# Patient Record
Sex: MALE | URBAN - METROPOLITAN AREA
[De-identification: names, ages, dates, MRNs, and addresses within clinical notes are randomized per-mention and may not be internally consistent; named-entity substitution may affect disease eponyms.]

---

## 2019-08-30 ENCOUNTER — HOSPITAL ENCOUNTER (OUTPATIENT)
Facility: CLINIC | Age: 49
Setting detail: OBSERVATION
Discharge: LEFT BEFORE DISCHARGE | End: 2019-08-31
Attending: EMERGENCY MEDICINE | Admitting: HOSPITALIST
Payer: COMMERCIAL

## 2019-08-30 DIAGNOSIS — F15.929 METHAMPHETAMINE INTOXICATION (H): ICD-10-CM

## 2019-08-30 LAB
BASOPHILS # BLD AUTO: 0 10E9/L (ref 0–0.2)
BASOPHILS NFR BLD AUTO: 0.5 %
DIFFERENTIAL METHOD BLD: NORMAL
EOSINOPHIL # BLD AUTO: 0.1 10E9/L (ref 0–0.7)
EOSINOPHIL NFR BLD AUTO: 1.4 %
ERYTHROCYTE [DISTWIDTH] IN BLOOD BY AUTOMATED COUNT: 13.1 % (ref 10–15)
HCT VFR BLD AUTO: 41.8 % (ref 40–53)
HGB BLD-MCNC: 14.8 G/DL (ref 13.3–17.7)
IMM GRANULOCYTES # BLD: 0 10E9/L (ref 0–0.4)
IMM GRANULOCYTES NFR BLD: 0.7 %
LYMPHOCYTES # BLD AUTO: 1.6 10E9/L (ref 0.8–5.3)
LYMPHOCYTES NFR BLD AUTO: 26.6 %
MCH RBC QN AUTO: 30.1 PG (ref 26.5–33)
MCHC RBC AUTO-ENTMCNC: 35.4 G/DL (ref 31.5–36.5)
MCV RBC AUTO: 85 FL (ref 78–100)
MONOCYTES # BLD AUTO: 0.4 10E9/L (ref 0–1.3)
MONOCYTES NFR BLD AUTO: 6.6 %
NEUTROPHILS # BLD AUTO: 3.8 10E9/L (ref 1.6–8.3)
NEUTROPHILS NFR BLD AUTO: 64.2 %
NRBC # BLD AUTO: 0 10*3/UL
NRBC BLD AUTO-RTO: 0 /100
PLATELET # BLD AUTO: 345 10E9/L (ref 150–450)
RBC # BLD AUTO: 4.91 10E12/L (ref 4.4–5.9)
WBC # BLD AUTO: 5.9 10E9/L (ref 4–11)

## 2019-08-30 PROCEDURE — 85025 COMPLETE CBC W/AUTO DIFF WBC: CPT | Performed by: EMERGENCY MEDICINE

## 2019-08-30 PROCEDURE — 99285 EMERGENCY DEPT VISIT HI MDM: CPT

## 2019-08-30 PROCEDURE — 80053 COMPREHEN METABOLIC PANEL: CPT | Performed by: EMERGENCY MEDICINE

## 2019-08-30 PROCEDURE — 93005 ELECTROCARDIOGRAM TRACING: CPT

## 2019-08-30 PROCEDURE — 80329 ANALGESICS NON-OPIOID 1 OR 2: CPT | Performed by: EMERGENCY MEDICINE

## 2019-08-30 RX ORDER — LORAZEPAM 2 MG/ML
1 INJECTION INTRAMUSCULAR ONCE
Status: DISCONTINUED | OUTPATIENT
Start: 2019-08-30 | End: 2019-08-31

## 2019-08-30 ASSESSMENT — ENCOUNTER SYMPTOMS
NAUSEA: 1
VOMITING: 0

## 2019-08-31 VITALS
SYSTOLIC BLOOD PRESSURE: 135 MMHG | OXYGEN SATURATION: 97 % | RESPIRATION RATE: 16 BRPM | TEMPERATURE: 97.6 F | HEART RATE: 92 BPM | DIASTOLIC BLOOD PRESSURE: 90 MMHG | WEIGHT: 256.17 LBS

## 2019-08-31 PROBLEM — T43.621A OVERDOSE BY AMPHETAMINE (H): Status: ACTIVE | Noted: 2019-08-31

## 2019-08-31 LAB
ALBUMIN SERPL-MCNC: 3.7 G/DL (ref 3.4–5)
ALP SERPL-CCNC: 75 U/L (ref 40–150)
ALT SERPL W P-5'-P-CCNC: 102 U/L (ref 0–70)
AMPHETAMINES UR QL SCN: POSITIVE
ANION GAP SERPL CALCULATED.3IONS-SCNC: 8 MMOL/L (ref 3–14)
APAP SERPL-MCNC: <2 MG/L (ref 10–20)
AST SERPL W P-5'-P-CCNC: 63 U/L (ref 0–45)
BARBITURATES UR QL: NEGATIVE
BENZODIAZ UR QL: NEGATIVE
BILIRUB SERPL-MCNC: 0.8 MG/DL (ref 0.2–1.3)
BUN SERPL-MCNC: 10 MG/DL (ref 7–30)
CALCIUM SERPL-MCNC: 9.1 MG/DL (ref 8.5–10.1)
CANNABINOIDS UR QL SCN: NEGATIVE
CHLORIDE SERPL-SCNC: 104 MMOL/L (ref 94–109)
CO2 SERPL-SCNC: 26 MMOL/L (ref 20–32)
COCAINE UR QL: NEGATIVE
CREAT SERPL-MCNC: 0.76 MG/DL (ref 0.66–1.25)
GFR SERPL CREATININE-BSD FRML MDRD: >90 ML/MIN/{1.73_M2}
GLUCOSE SERPL-MCNC: 108 MG/DL (ref 70–99)
INTERPRETATION ECG - MUSE: NORMAL
OPIATES UR QL SCN: NEGATIVE
PCP UR QL SCN: NEGATIVE
POTASSIUM SERPL-SCNC: 3.6 MMOL/L (ref 3.4–5.3)
PROT SERPL-MCNC: 8.3 G/DL (ref 6.8–8.8)
SODIUM SERPL-SCNC: 138 MMOL/L (ref 133–144)

## 2019-08-31 PROCEDURE — 25000128 H RX IP 250 OP 636: Performed by: EMERGENCY MEDICINE

## 2019-08-31 PROCEDURE — 99219 ZZC INITIAL OBSERVATION CARE,LEVL II: CPT | Performed by: HOSPITALIST

## 2019-08-31 PROCEDURE — G0378 HOSPITAL OBSERVATION PER HR: HCPCS

## 2019-08-31 PROCEDURE — 99203 OFFICE O/P NEW LOW 30 MIN: CPT | Performed by: PSYCHIATRY & NEUROLOGY

## 2019-08-31 PROCEDURE — 93005 ELECTROCARDIOGRAM TRACING: CPT

## 2019-08-31 PROCEDURE — 96360 HYDRATION IV INFUSION INIT: CPT

## 2019-08-31 PROCEDURE — 93010 ELECTROCARDIOGRAM REPORT: CPT | Performed by: INTERNAL MEDICINE

## 2019-08-31 PROCEDURE — 40000275 ZZH STATISTIC RCP TIME EA 10 MIN

## 2019-08-31 PROCEDURE — 80307 DRUG TEST PRSMV CHEM ANLYZR: CPT | Performed by: HOSPITALIST

## 2019-08-31 PROCEDURE — 25800030 ZZH RX IP 258 OP 636: Performed by: HOSPITALIST

## 2019-08-31 RX ORDER — SODIUM CHLORIDE 9 MG/ML
INJECTION, SOLUTION INTRAVENOUS CONTINUOUS
Status: DISCONTINUED | OUTPATIENT
Start: 2019-08-31 | End: 2019-08-31 | Stop reason: HOSPADM

## 2019-08-31 RX ORDER — AMOXICILLIN 250 MG
2 CAPSULE ORAL 2 TIMES DAILY PRN
Status: DISCONTINUED | OUTPATIENT
Start: 2019-08-31 | End: 2019-08-31 | Stop reason: HOSPADM

## 2019-08-31 RX ORDER — ACETAMINOPHEN 325 MG/1
650 TABLET ORAL EVERY 4 HOURS PRN
Status: DISCONTINUED | OUTPATIENT
Start: 2019-08-31 | End: 2019-08-31 | Stop reason: HOSPADM

## 2019-08-31 RX ORDER — ACETAMINOPHEN 650 MG/1
650 SUPPOSITORY RECTAL EVERY 4 HOURS PRN
Status: DISCONTINUED | OUTPATIENT
Start: 2019-08-31 | End: 2019-08-31 | Stop reason: HOSPADM

## 2019-08-31 RX ORDER — LANOLIN ALCOHOL/MO/W.PET/CERES
3 CREAM (GRAM) TOPICAL
Status: DISCONTINUED | OUTPATIENT
Start: 2019-08-31 | End: 2019-08-31 | Stop reason: HOSPADM

## 2019-08-31 RX ORDER — POLYETHYLENE GLYCOL 3350 17 G/17G
17 POWDER, FOR SOLUTION ORAL DAILY PRN
Status: DISCONTINUED | OUTPATIENT
Start: 2019-08-31 | End: 2019-08-31 | Stop reason: HOSPADM

## 2019-08-31 RX ORDER — BISACODYL 10 MG
10 SUPPOSITORY, RECTAL RECTAL DAILY PRN
Status: DISCONTINUED | OUTPATIENT
Start: 2019-08-31 | End: 2019-08-31 | Stop reason: HOSPADM

## 2019-08-31 RX ORDER — NALOXONE HYDROCHLORIDE 0.4 MG/ML
.1-.4 INJECTION, SOLUTION INTRAMUSCULAR; INTRAVENOUS; SUBCUTANEOUS
Status: DISCONTINUED | OUTPATIENT
Start: 2019-08-31 | End: 2019-08-31 | Stop reason: HOSPADM

## 2019-08-31 RX ORDER — ONDANSETRON 2 MG/ML
4 INJECTION INTRAMUSCULAR; INTRAVENOUS EVERY 6 HOURS PRN
Status: DISCONTINUED | OUTPATIENT
Start: 2019-08-31 | End: 2019-08-31 | Stop reason: HOSPADM

## 2019-08-31 RX ORDER — AMOXICILLIN 250 MG
1 CAPSULE ORAL 2 TIMES DAILY PRN
Status: DISCONTINUED | OUTPATIENT
Start: 2019-08-31 | End: 2019-08-31 | Stop reason: HOSPADM

## 2019-08-31 RX ORDER — ONDANSETRON 4 MG/1
4 TABLET, ORALLY DISINTEGRATING ORAL EVERY 6 HOURS PRN
Status: DISCONTINUED | OUTPATIENT
Start: 2019-08-31 | End: 2019-08-31 | Stop reason: HOSPADM

## 2019-08-31 RX ORDER — LIDOCAINE 40 MG/G
CREAM TOPICAL
Status: DISCONTINUED | OUTPATIENT
Start: 2019-08-31 | End: 2019-08-31 | Stop reason: HOSPADM

## 2019-08-31 RX ADMIN — SODIUM CHLORIDE: 9 INJECTION, SOLUTION INTRAVENOUS at 02:39

## 2019-08-31 RX ADMIN — SODIUM CHLORIDE 1000 ML: 9 INJECTION, SOLUTION INTRAVENOUS at 00:00

## 2019-08-31 RX ADMIN — SODIUM CHLORIDE: 9 INJECTION, SOLUTION INTRAVENOUS at 12:45

## 2019-08-31 NOTE — PROGRESS NOTES
RECEIVING UNIT ED HANDOFF REVIEW    ED Nurse Handoff Report was reviewed by: Elba Farfan RN on August 31, 2019 at 1:17 AM

## 2019-08-31 NOTE — ED NOTES
"[facility]  ED Nurse Handoff Report    ED Chief complaint: Ingestion      ED Diagnosis:   Final diagnoses:   Methamphetamine intoxication (H)       Code Status: Full Code    Allergies: No Known Allergies    Activity level - Baseline/Home:  Independent  Activity Level - Current:   Stand with Assist    Patient's Preferred language: English   Needed?: No    Isolation: No  Infection: Not Applicable  Bariatric?: No    Vital Signs:   Vitals:    08/30/19 2208 08/30/19 2245 08/31/19 0000 08/31/19 0014   BP: (!) 172/99  (!) 130/93    Pulse:   99    Temp:  98.2  F (36.8  C)     TempSrc:  Oral     SpO2: 98%  95% 93%       Cardiac Rhythm: ,        Pain level:      Is this patient confused?: No   Does this patient have a guardian?  No         If yes, is there guardianship documents in the Epic \"Code/ACP\" activity?  N/A         Guardian Notified?  N/A  [place] - Suicide Severity Rating Scale Completed?  Yes  If yes, what color did the patient score?  White    Patient Report: Initial Complaint: C/O meth ingestion.  Focused Assessment: A & O.  Hypertensive but other VSS.  Pt stated that he ingested 2-3 grams of meth; prior to his process of getting arrested.  Pt has been on [place].  Pt calm, cooperative and currently sleeping.    Tests Performed: labs, UA (pt has not given sample yet)  Abnormal Results:   Results for orders placed or performed during the hospital encounter of 08/30/19   CBC with platelets differential   Result Value Ref Range    WBC 5.9 4.0 - 11.0 10e9/L    RBC Count 4.91 4.4 - 5.9 10e12/L    Hemoglobin 14.8 13.3 - 17.7 g/dL    Hematocrit 41.8 40.0 - 53.0 %    MCV 85 78 - 100 fl    MCH 30.1 26.5 - 33.0 pg    MCHC 35.4 31.5 - 36.5 g/dL    RDW 13.1 10.0 - 15.0 %    Platelet Count 345 150 - 450 10e9/L    Diff Method Automated Method     % Neutrophils 64.2 %    % Lymphocytes 26.6 %    % Monocytes 6.6 %    % Eosinophils 1.4 %    % Basophils 0.5 %    % Immature Granulocytes 0.7 %    Nucleated " RBCs 0 0 /100    Absolute Neutrophil 3.8 1.6 - 8.3 10e9/L    Absolute Lymphocytes 1.6 0.8 - 5.3 10e9/L    Absolute Monocytes 0.4 0.0 - 1.3 10e9/L    Absolute Eosinophils 0.1 0.0 - 0.7 10e9/L    Absolute Basophils 0.0 0.0 - 0.2 10e9/L    Abs Immature Granulocytes 0.0 0 - 0.4 10e9/L    Absolute Nucleated RBC 0.0    Comprehensive metabolic panel   Result Value Ref Range    Sodium 138 133 - 144 mmol/L    Potassium 3.6 3.4 - 5.3 mmol/L    Chloride 104 94 - 109 mmol/L    Carbon Dioxide 26 20 - 32 mmol/L    Anion Gap 8 3 - 14 mmol/L    Glucose 108 (H) 70 - 99 mg/dL    Urea Nitrogen 10 7 - 30 mg/dL    Creatinine 0.76 0.66 - 1.25 mg/dL    GFR Estimate >90 >60 mL/min/[1.73_m2]    GFR Estimate If Black >90 >60 mL/min/[1.73_m2]    Calcium 9.1 8.5 - 10.1 mg/dL    Bilirubin Total 0.8 0.2 - 1.3 mg/dL    Albumin 3.7 3.4 - 5.0 g/dL    Protein Total 8.3 6.8 - 8.8 g/dL    Alkaline Phosphatase 75 40 - 150 U/L     (H) 0 - 70 U/L    AST 63 (H) 0 - 45 U/L     Treatments provided: 1L bolus    Family Comments: NA    OBS brochure/video discussed/provided to patient/family: Yes ( pt sleeping and unable to read the brochure at this time)              Name of person given brochure if not patient: NA              Relationship to patient: NA    ED Medications:   Medications   0.9% sodium chloride BOLUS (1,000 mLs Intravenous New Bag 8/31/19 0000)   LORazepam (ATIVAN) injection 1 mg (has no administration in time range)       Drips infusing?:  No    For the majority of the shift this patient was Green.   Interventions performed were NA.    Severe Sepsis OR Septic Shock Diagnosis Present: No    To be done/followed up on inpatient unit:  see [facility]    ED NURSE PHONE NUMBER: [phone]

## 2019-08-31 NOTE — PROGRESS NOTES
Talked to Poison Control. They stated, per protocol, to watch until 2230 tonight -which is 24 hours since admission. To watch out for hypertension, tachycardia, restlessness and aggression. If those occur, to treat with benzos. Also, to update them if that does happen at [phone].

## 2019-08-31 NOTE — ED TRIAGE NOTES
Pt here by police. Police stated that pt has warrants and was in the process of being arrested when the pt stated he ingested 2-3grams of meth. On arrival. Pt is calm and cooperative. VSS. Police stated they will be sitting at bedside until pt is cleared medically.

## 2019-08-31 NOTE — ED NOTES
Bed: ED16  Expected date:   Expected time:   Means of arrival:   Comments:  [facility] pt on meth

## 2019-08-31 NOTE — PHARMACY-ADMISSION MEDICATION HISTORY
Admission medication history interview status for the 8/30/2019  admission is complete. See EPIC admission navigator for prior to admission medications     Medication history source reliability:Good    Actions taken by pharmacist (provider contacted, etc): Chart review and interviewed patient     Additional medication history information not noted on PTA med list : Patient verifies that he is not currently taking any medications, but confirms that he completed a 10-day course of Augmentin ~1 week ago.    Medication reconciliation/reorder completed by provider prior to medication history? N/A    Time spent in this activity: 5 minutes    Prior to Admission medications    Not on File

## 2019-08-31 NOTE — H&P
"[facility]    History and Physical  Hospitalist       Date of Admission:  8/30/2019  Date of Service (when I saw the patient): 08/31/19    ASSESSMENT  Ash Gunderson is a 49 year old gentleman with chronic Hepatitis C who presents with intentional methamphetamine ingestion while in the process of being arrested. He is admitted for acute toxic encephalopathy.    PLAN    1) Acute toxic encephalopathy:    -- Observation with telemetry. Check EKG. NPO for now. Q 4 hour neuro checks. Psychiatry consulted. Place on medical hold if he tries to elope prior to their assessment. IV fluid ordered. If he develops hemodynamic instability will order IV Ativan.    Chief Complaint   Near syncope    History is obtained from the patient and the ED physician whom I have spoken with    History of Present Illness   Ash Gunderson is a 49 year old gentleman who presents with ingestion of amphetamines. Per ED note: \"Ash Gunderson is a 49 year old male who presents to the ED via PD for evaluation following ingestion. [place] PD states that the patient was in the process of being arrested when he ingested 2-3 grams of unpackaged methamphetamine. The patient states this happened about one hour prior to presentation to the ED. He states that he feels like he's going to \"black out\", also complaining of nausea and leg swelling. He denies any vomiting.\"    On my assessment he is now very somnolent at present, able to deny chest or abdominal pain to me, or dyspnea, diaphoresis, nausea, or any other acute complaints.    In the ED, Blood pressure 118/71, pulse 92, temperature 98.2  F (36.8  C), temperature source Oral, SpO2 97 %.    CBC and CMP were done and showed , AST 63, otherwise were within normal reference range. Acetaminophen level negative. Poison control was contacted and recommended 24 hour observation. Police remain at the bedside.    PHYSICAL EXAM  Blood pressure 118/71, pulse 92, temperature 98.2  F (36.8 "  C), temperature source Oral, SpO2 97 %.  Constitutional: Somnolent but arousable; no apparent distress  HEENT: normocephalic moist mucus membranes  Respiratory: lungs clear to auscultation bilaterally  Cardiovascular: regular S1 S2   GI: abdomen soft non tender non distended bowel sounds positive  Lymph/Hematologic: no pallor, no cervical lymphadenopathy  Skin: several rounded excoriations, most notable one over the right dorsal hand/wrsit area; that appear chronic; good turgor  Musculoskeletal: no clubbing, cyanosis or edema  Neurologic: extra-ocular muscles intact; moves all four extremities     DVT Prophylaxis: Pneumatic Compression Devices  Code Status: Full Code presumed    Disposition: Expected discharge in 0-1 days    Emanuel Hinson MD    Past Medical History    I have reviewed this patient's medical history and updated it with pertinent information if needed.   Past Medical History:   Diagnosis Date     Hepatitis C        Past Surgical History   I have reviewed this patient's surgical history and updated it with pertinent information if needed.  Past Surgical History:   Procedure Laterality Date     COLONOSCOPY         Prior to Admission Medications   None     Allergies   No Known Allergies    Social History   I have reviewed this patient's social history and updated it with pertinent information if needed. Ash Gunderson  reports that he has been smoking.  He does not have any smokeless tobacco history on file. He reports that he has current or past drug history. Drug: Methamphetamines.    Family History   Family history assessed and, except as above, is non-contributory.    Review of Systems   The 10 point Review of Systems is negative other than noted in the HPI or here.     Primary Care Physician   No primary care provider on file.    Data   Labs Ordered and Resulted from Time of ED Arrival Up to the Time of Departure from the ED   COMPREHENSIVE METABOLIC PANEL - Abnormal; Notable for the  following components:       Result Value    Glucose 108 (*)      (*)     AST 63 (*)     All other components within normal limits   CBC WITH PLATELETS DIFFERENTIAL   ACETAMINOPHEN LEVEL   DRUG ABUSE SCREEN 77 URINE (FL, RH, SH)   PERIPHERAL IV CATHETER   CARDIAC CONTINUOUS MONITORING       Data reviewed today:  I personally reviewed no images or EKG's today.    No results found for this or any previous visit (from the past 24 hour(s)).

## 2019-08-31 NOTE — PLAN OF CARE
DATE & TIME: Day shift, 8/31/19  Cognitive Concerns/ Orientation : alert and oriented x4, lethargic  BEHAVIOR & AGGRESSION TOOL COLOR: GREEN  CIWA SCORE: NA   ABNL VS/O2: , other VS  MOBILITY: SBA, FR  PAIN MANAGMENT: none needed  DIET: NPO, will page MD for diet  BOWEL/BLADDER: continent, no BM  ABNL LAB/BG: UA positive for amphetamines   DRAIN/DEVICES: IV infusing NS at 100 ml/hr  TELEMETRY RHYTHM: NSR  SKIN: intact, bruised  TESTS/PROCEDURES: none  D/C DAY/GOALS/PLACE: Psych consulted, not holdable. Ordered CD consult. Per poison control monitor for 24 hours and if medically stable, can leave.   OTHER IMPORTANT INFO: Was brought by Police due to having warrants for his arrest.

## 2019-08-31 NOTE — PLAN OF CARE
DATE & TIME: 08/31/19, night shift    Cognitive Concerns/ Orientation : [place]ANCES[place]   BEHAVIOR & AGGRESSION TOOL COLOR: green  CIWA SCORE:    ABNL VS/O2: VSS, room air  MOBILITY: A-1/2  PAIN MANAGMENT: denies  DIET: NPO  BOWEL/BLADDER:   ABNL LAB/BG:   DRAIN/DEVICES: PIV infusing at 100 mL/hr  TELEMETRY RHYTHM: NSR  SKIN: wound to right hand and right foot, open to air  TESTS/PROCEDURES: Psych, SW, Care coordinator consults  D/C DAY/GOALS/PLACE: pending  OTHER IMPORTANT INFO: VPM in use, somnolent, neuros intact, holdable if attempts to leave before psych consult

## 2019-08-31 NOTE — CONSULTS
Care Transition Initial Assessment - SW     Met with: Patient    Active Problems:    Overdose by amphetamine (H)       DATA  Lives With: friend(s)   Quality of Family Relationships: unable to assess  Identified issues/concerns regarding health management: discharge planning  Quality of Family Relationships: unable to assess  ASSESSMENT  Cognitive Status:  awake, alert and oriented  Concerns to be addressed: Discharge planning .        Per social service protocol for discharge planning. Patient admitted observation on 08/31/2019 for an overdose of amphetamine.  Discharge is TBD. SW met with patient to to introduce self/role and discuss discharge plans. Per patient, prior to hospitlization, he lived in a house with roommates. Patient denies concerns regarding housing. Patient reports current resources/income meet needs. Patient informed of chem dep consult and need to complete paperwork for rule 25. Patient verbalized an understanding. Patient reports an interest in meeting with counselor to explore options for treatment. Per chem dep counselors request, SW provided patient with a application for services and informed him chem dep counselor will meet with him on 09/01/2019. Patient appreciative of SW visit and reports that he will complete the paperwork.  Patient denies need for  services at this time. SW encouraged patient to reach out for any additional questions/concerns.           PLAN  Patient to meet with chem dep counselor on 09/01/2019.

## 2019-08-31 NOTE — CONSULTS
"Consult Date:  08/31/2019      REASON FOR CONSULTATION:  Amphetamine overdose.      REQUESTING PHYSICIAN:  Dr. Hinson      IDENTIFYING DATA:  The patient is a 49-year-old gentleman with a known history of drug use.  He came in encephalopathic.  He was being arrested and ingested 2-3 grams of unpacked methamphetamine in an attempt to evade arrest.      CHIEF COMPLAINT:  \"I got out of [facility] a couple months ago.\"      HISTORY OF PRESENT ILLNESS:  The patient is a 49-year-old gentleman with a known history of an amphetamine use disorder, admitted with the above circumstances.  He is convalescing on station 66.  He was assessed in our ER after this ingestion.  He is not suicidal, he acknowledges he has been struggling with amphetamine use, I believe he has a warrant out for his arrest because of narcotic possession that he has to deal with.  He has a distant history of being on some Zoloft for depression, but does not take anything currently for his mood.  He is still somewhat groggy and a bit hazy in terms of his thought process.  He knows he is in the hospital, but admits he feels like \"he is in a fog.\"  He denies any thoughts of wanting to hurt himself or others.  He says that he has insurance and when he went through treatment a couple months ago at [facility] he had a Rule 25, ended up in a care home house, but I believe he was kicked out because he started using and he did not follow through with recommendations for aftercare for [facility].      On further questioning, there is no convincing history of hypomania, cesar, generalized anxiety disorder, panic disorder, obsessive-compulsive disorder, eating disorder history or trauma history.  He also denies ADHD.  He alludes to some mild depression symptoms, but acknowledges sobriety has been hard to come by.  Prior to abusing methamphetamines, he was abusing cocaine.  He denies other illicit substance use or alcohol abuse.  He is not on any scheduled " "psychotropic medications.  I did review his labs and these came back basically normal other than a mildly elevated ALT and AST.  His drug screen shows amphetamines.      PAST PSYCHIATRIC HISTORY:  As above.      PAST CHEMICAL DEPENDENCY HISTORY:  As above.      PAST MEDICAL HISTORY:  Per chart review includes encephalopathy from amphetamine ingestion this admission, hepatitis C.      PRIOR TO ADMISSION MEDICATIONS:  None.      FAMILY HISTORY:  Negative for mental illness, chemical dependency or suicide.      SOCIAL HISTORY:  The patient is single, never , lives in [place], lives in [place].  He has 1 sibling and grew up in the [place].  He has a college degree.  He has been unable to maintain employment because of his use, but used to have a relatively high paying job.  He denies trauma history.  He currently has a warrant out for his arrest that may relate to his narcotic possession.      REVIEW OF SYSTEMS:  A 10-point review of systems is notable for somnolence on neurologic exam.      MOST RECENT VITAL SIGNS:  Temperature 97.5, pulse 92, respiratory rate 18, blood pressure 131/87, oxygen saturation 98%.      MENTAL STATUS EXAMINATION:  Appearance:  The patient is a disheveled man lying in bed.  His eyes are at half-mast.  He is a fair to poor historian.  Speech is soft.  Rate and flow normal, use of language appropriate.  Motor exam calm.  Coordination, station, gait not tested.  Muscle strength and tone adequate.  Affect is constricted.  Mood \"okay.\"  Thought process logical, coherent and goal directed.  No loosening of associations, no flight of ideas.  No formal thought disorder on exam.  Thought content negative for hallucinations, delusions, paranoia, suicidal or homicidal ideation.  Insight and judgment poor.  Cognitive exam:  The patient is mildly somnolent, rouses to voice, concentration poor.  Recent memory intact.  Remote memory intact.  General fund of knowledge average.  The patient " is oriented x 3.      IMPRESSION:  The patient is a 49-year-old gentleman with a delirium secondary to an intentional amphetamine ingestion to evade arrest.  We can offer a CD assessment/Rule 25 option.  It is not clear this can be done on the weekend.  He could go to [address] [place] [address] as well.  When he is medically stabilized, he can be discharged.  I do not see any reason to intervene otherwise psychiatrically.      DIAGNOSES:   1.  Delirium secondary to amphetamine ingestion.   2.  Amphetamine use disorder.   3.  Cocaine use disorder by history.   4.  Hepatitis C.      PLAN:   1.  Medical management and observation.   2.  I did order a CD assessment if they can complete this over the weekend as it may incur the need for a Rule 25, an [address] referral would be appropriate.   3.  The patient can be discharged when deemed medically cleared.         SB CARD MD             D: 2019   T: 2019   MT: PUNEET      Name:     DAWIT BEASLEY   MRN:      [id number]        Account:       [id number]   :      1970           Consult Date:  2019      Document: [id number]       cc: Emanuel Hinson MD

## 2019-08-31 NOTE — ED PROVIDER NOTES
"  History     Chief Complaint:  Ingestion    HPI   Ash Gunderson is a 49 year old male who presents to the ED via PD for evaluation following ingestion. [place] PD states that the patient was in the process of being arrested when he ingested 2-3 grams of unpackaged methamphetamine. The patient states this happened about one hour prior to presentation to the ED. He states that he feels like he's going to \"black out\", also complaining of nausea and leg swelling. He denies any vomiting.    Allergies:  The patient has no known drug allergies.    Medications:    The patient is not currently on any daily prescription medications.    Past Medical History:    Hepatitis C    Past Surgical History:    The patient does not have any pertinent past surgical history.     Family History:    Hyperlipidemia, father  Hypertension, father  Heart disease, mother    Social History:  Former smoker, quit 1/02/2011.  Negative for alcohol use.  Methamphetamine use.  Marital Status:  Single [1]     Review of Systems   Cardiovascular: Positive for leg swelling.   Gastrointestinal: Positive for nausea. Negative for vomiting.   Psychiatric/Behavioral:        Ingestion   All other systems reviewed and are negative.      Physical Exam     Patient Vitals for the past 24 hrs:   BP Temp Temp src Pulse Heart Rate SpO2   08/31/19 0014 -- -- -- -- 93 93 %   08/31/19 0000 (!) 130/93 -- -- 99 91 95 %   08/30/19 2245 -- 98.2  F (36.8  C) Oral -- -- --   08/30/19 2208 (!) 172/99 -- -- -- 100 98 %     Physical Exam  Eyes:  The pupils are equal and round    Conjunctivae and sclerae are normal  ENT:    The nose is normal    Pinnae are normal    The oropharynx is normal  Neck:  Normal range of motion    There is no rigidity noted    There is no midline cervical spine tenderness    Trachea is in the midline  CV:  Tachycardic and regular     No edema  Resp:  Lungs are clear    Non-labored    No rales    No wheezing   GI:  Abdomen is soft and non-tender, there is " no rigidity  MS:  Normal muscular tone    No asymmetric leg swelling  Skin:  No rash or acute skin lesions noted. No diaphoresis  Neuro:   Awake, alert.     Tremor present    No hyperreflexia     Speech is normal and fluent.    Face is symmetric.     Moves all extremities  Psych:  Denies feeling suicidal    Emergency Department Course   Laboratory:  CBC: WBC: 5.9, HGB: 14.8, PLT: 345  CMP: Glucose 108 (H), ALT: 102 (H), AST: 63 (H), o/w WNL (Creatinine: 0.76)  Acetaminophen level: Pending    Drug Abuse Screen 77 Urine: Pending    Interventions:  0000 NS 1L IV Bolus    Emergency Department Course:  Nursing notes and vitals reviewed. (2220) I performed an exam of the patient as documented above.     IV inserted. Medicine administered as documented above. Blood drawn. Urine collected. This was sent to the lab for further testing, results above.     0016 I rechecked the patient and discussed the results of his workup thus far.     Findings and plan explained to the Patient who consents to admission.     Discussed the patient with Dr. Hinson, who will admit the patient to a monitored bed for further monitoring, evaluation, and treatment.     Impression & Plan      Medical Decision Making:  Ash Gunderson is a 49 year old male who presents the emergency department after ingesting to 3 g of on package of methamphetamine prior to arrival.  He is in the presence of a dental police.  He reports feeling anxious and tremulous.  He denies at the methamphetamine was wrapped.  He has also had nausea.  He is slightly tachycardic on arrival.  Is not diaphoretic.  There is a slight tremor.  Discussed patient with poison control who recommended observation for 24 hours due to the possibility of stuffing.  EKG here showed a sinus rhythm.  Ativan was initially ordered, but later discontinued as he began sleeping.  CBC is normal.  He does have hepatitis C and his LFTs were slightly abnormal.  Tylenol level is added on.  Patient was  discussed with the hospitalist agreed to accept the patient as an observation admission.    Diagnosis:    ICD-10-CM    1. Methamphetamine intoxication (H) F15.929 Acetaminophen level     Acetaminophen level     CANCELED: Acetaminophen level       Disposition:  Admitted to Observation  Scribe Disclosure:  I, Savannah Crabtree, am serving as a scribe on 8/30/2019 at 10:20 PM to personally document services performed by Ish Ozuna MD based on my observations and the provider's statements to me.     Savannah Crabtree  8/30/2019    EMERGENCY DEPARTMENT       Ish Ozuna MD  08/31/19 0101

## 2019-08-31 NOTE — PROGRESS NOTES
Admission    Patient arrives to room 614-2 via cart from ED.      Inpatient nursing criteria listed below were met:    PCD's Documented: NA  Skin issues/needs documented :Yes  Isolation education started/completed NA  Patient allergies verified with patient: No  Verified completion of [place] Risk Assessment Tool:  Yes  Verified completion of Guardianship screening tool: Yes  Fall Prevention: Care plan updated, Education given and documented Yes  Care Plan initiated: Yes  Home medications documented in belongings flowsheet: NA  Patient belongings documented in belongings flowsheet: Yes  Reminder note (belongings/ medications) placed in discharge instructions:Yes  Admission profile/ required documentation complete: Yes  Bedside Report Letter given and explained to patient No

## 2019-08-31 NOTE — PROGRESS NOTES
8/31/19 chem dep consult acknowledged. Per EHR patient does not have any funding listed, he would need Rule 25 funding from [facility] for any treatment services.  Emailed with social regarding this and requested she discuss with patient his desires for treatment and if he is interested in treatment she can provide him with application for services prior to me completing Rule 25 assessment.

## 2019-09-04 LAB — INTERPRETATION ECG - MUSE: NORMAL

## 2020-11-05 ENCOUNTER — HOSPITAL ENCOUNTER (EMERGENCY)
Facility: CLINIC | Age: 50
Discharge: LEFT BEFORE DISCHARGE | End: 2020-11-05
Attending: EMERGENCY MEDICINE | Admitting: EMERGENCY MEDICINE
Payer: COMMERCIAL

## 2020-11-05 VITALS
DIASTOLIC BLOOD PRESSURE: 112 MMHG | RESPIRATION RATE: 32 BRPM | TEMPERATURE: 98 F | SYSTOLIC BLOOD PRESSURE: 177 MMHG | OXYGEN SATURATION: 97 % | HEART RATE: 112 BPM

## 2020-11-05 PROCEDURE — 999N000104 HC STATISTIC NO CHARGE

## 2020-11-05 NOTE — ED TRIAGE NOTES
Pt coming from home, c/o abd pain, heartburn, constipation, took too much Methamphetamine. Pt was being served a warrant and PD found pt bent chino in pain.

## 2020-11-05 NOTE — ED PROVIDER NOTES
Came on shift and went to see the patient who had left without being seen.  He left the gown and no belongings and eloped from the emergency department prior to my evaluation.     Aleena yAoub MD  11/05/20 141

## 2020-11-05 NOTE — ED NOTES
Bed: ED04  Expected date:   Expected time:   Means of arrival:   Comments:  Mickey - 515 - 50 M meth use abd pain eta 1333

## 2025-02-23 ENCOUNTER — HOSPITAL ENCOUNTER (EMERGENCY)
Facility: CLINIC | Age: 55
Discharge: HOME | End: 2025-02-23
Attending: EMERGENCY MEDICINE | Admitting: EMERGENCY MEDICINE
Payer: COMMERCIAL

## 2025-02-23 ENCOUNTER — APPOINTMENT (OUTPATIENT)
Dept: GENERAL RADIOLOGY | Facility: CLINIC | Age: 55
End: 2025-02-23
Attending: EMERGENCY MEDICINE

## 2025-02-23 VITALS
DIASTOLIC BLOOD PRESSURE: 84 MMHG | OXYGEN SATURATION: 97 % | SYSTOLIC BLOOD PRESSURE: 163 MMHG | TEMPERATURE: 97.8 F | HEART RATE: 92 BPM | RESPIRATION RATE: 18 BRPM

## 2025-02-23 DIAGNOSIS — S83.92XA SPRAIN OF LEFT KNEE, UNSPECIFIED LIGAMENT, INITIAL ENCOUNTER: ICD-10-CM

## 2025-02-23 DIAGNOSIS — S80.02XA CONTUSION OF LEFT KNEE, INITIAL ENCOUNTER: ICD-10-CM
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
## 2025-02-23 PROCEDURE — 73562 X-RAY EXAM OF KNEE 3: CPT | Mod: LT

## 2025-02-23 PROCEDURE — 99283 EMERGENCY DEPT VISIT LOW MDM: CPT

## 2025-02-23 PROCEDURE — 250N000013 HC RX MED GY IP 250 OP 250 PS 637: Performed by: EMERGENCY MEDICINE

## 2025-02-23 RX ORDER — TRAMADOL HYDROCHLORIDE 50 MG/1
50 TABLET ORAL ONCE
Status: COMPLETED | OUTPATIENT
Start: 2025-02-23 | End: 2025-02-23

## 2025-02-23 RX ORDER — TRAMADOL HYDROCHLORIDE 50 MG/1
50 TABLET ORAL EVERY 6 HOURS PRN
Qty: 10 TABLET | Refills: 0 | Status: SHIPPED | OUTPATIENT
Start: 2025-02-23 | End: 2025-02-26

## 2025-02-23 RX ADMIN — TRAMADOL HYDROCHLORIDE 50 MG: 50 TABLET, COATED ORAL at 12:27

## 2025-02-23 ASSESSMENT — COLUMBIA-SUICIDE SEVERITY RATING SCALE - C-SSRS
2. HAVE YOU ACTUALLY HAD ANY THOUGHTS OF KILLING YOURSELF IN THE PAST MONTH?: NO
1. IN THE PAST MONTH, HAVE YOU WISHED YOU WERE DEAD OR WISHED YOU COULD GO TO SLEEP AND NOT WAKE UP?: NO
6. HAVE YOU EVER DONE ANYTHING, STARTED TO DO ANYTHING, OR PREPARED TO DO ANYTHING TO END YOUR LIFE?: NO
1. IN THE PAST MONTH, HAVE YOU WISHED YOU WERE DEAD OR WISHED YOU COULD GO TO SLEEP AND NOT WAKE UP?: NO
2. HAVE YOU ACTUALLY HAD ANY THOUGHTS OF KILLING YOURSELF IN THE PAST MONTH?: NO
6. HAVE YOU EVER DONE ANYTHING, STARTED TO DO ANYTHING, OR PREPARED TO DO ANYTHING TO END YOUR LIFE?: NO

## 2025-02-23 ASSESSMENT — ACTIVITIES OF DAILY LIVING (ADL)
ADLS_ACUITY_SCORE: 47
ADLS_ACUITY_SCORE: 47
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
## 2025-02-23 NOTE — DISCHARGE INSTRUCTIONS
I recommend altering dose of Tylenol, ibuprofen for your pain.  You can take the tramadol as needed for more severe pain.  Given this can cause constipation I recommend picking up a stool softener.  You should make an appointment to follow-up in orthopedic clinic for recheck.  You should wear your knee immobilizer and put weight on your leg as you are able.  Certainly if you develop worsening pain, swelling, redness or high fever, you should be rechecked in the emergency department.

## 2025-02-25 ENCOUNTER — APPOINTMENT (OUTPATIENT)
Dept: GENERAL RADIOLOGY | Facility: CLINIC | Age: 55
End: 2025-02-25
Attending: EMERGENCY MEDICINE
Payer: COMMERCIAL

## 2025-02-25 ENCOUNTER — HOSPITAL ENCOUNTER (EMERGENCY)
Facility: CLINIC | Age: 55
Discharge: HOME | End: 2025-02-25
Attending: EMERGENCY MEDICINE | Admitting: EMERGENCY MEDICINE
Payer: COMMERCIAL

## 2025-02-25 VITALS
OXYGEN SATURATION: 99 % | SYSTOLIC BLOOD PRESSURE: 142 MMHG | DIASTOLIC BLOOD PRESSURE: 91 MMHG | HEART RATE: 101 BPM | RESPIRATION RATE: 18 BRPM | HEIGHT: 72 IN | WEIGHT: 280 LBS | BODY MASS INDEX: 37.93 KG/M2 | TEMPERATURE: 97.8 F

## 2025-02-25 DIAGNOSIS — R60.0 PERIPHERAL EDEMA: ICD-10-CM

## 2025-02-25 DIAGNOSIS — S93.402A SPRAIN OF LEFT ANKLE, UNSPECIFIED LIGAMENT, INITIAL ENCOUNTER: ICD-10-CM

## 2025-02-25 DIAGNOSIS — M79.672 LEFT FOOT PAIN: ICD-10-CM
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
## 2025-02-25 LAB
ANION GAP SERPL CALCULATED.3IONS-SCNC: 13 MMOL/L (ref 7–15)
BUN SERPL-MCNC: 14.5 MG/DL (ref 6–20)
CALCIUM SERPL-MCNC: 9.4 MG/DL (ref 8.8–10.4)
CHLORIDE SERPL-SCNC: 102 MMOL/L (ref 98–107)
CREAT SERPL-MCNC: 0.74 MG/DL (ref 0.67–1.17)
EGFRCR SERPLBLD CKD-EPI 2021: >90 ML/MIN/1.73M2
GLUCOSE SERPL-MCNC: 112 MG/DL (ref 70–99)
HCO3 SERPL-SCNC: 25 MMOL/L (ref 22–29)
POTASSIUM SERPL-SCNC: 4.1 MMOL/L (ref 3.4–5.3)
SODIUM SERPL-SCNC: 140 MMOL/L (ref 135–145)

## 2025-02-25 PROCEDURE — 73630 X-RAY EXAM OF FOOT: CPT | Mod: LT

## 2025-02-25 PROCEDURE — 80048 BASIC METABOLIC PNL TOTAL CA: CPT | Performed by: EMERGENCY MEDICINE

## 2025-02-25 PROCEDURE — 99284 EMERGENCY DEPT VISIT MOD MDM: CPT | Performed by: EMERGENCY MEDICINE

## 2025-02-25 PROCEDURE — 36415 COLL VENOUS BLD VENIPUNCTURE: CPT | Performed by: EMERGENCY MEDICINE

## 2025-02-25 PROCEDURE — 73590 X-RAY EXAM OF LOWER LEG: CPT | Mod: LT

## 2025-02-25 RX ORDER — FUROSEMIDE 20 MG/1
40 TABLET ORAL DAILY
Qty: 60 TABLET | Refills: 0 | Status: SHIPPED | OUTPATIENT
Start: 2025-02-25 | End: 2025-03-27

## 2025-02-25 ASSESSMENT — COLUMBIA-SUICIDE SEVERITY RATING SCALE - C-SSRS
2. HAVE YOU ACTUALLY HAD ANY THOUGHTS OF KILLING YOURSELF IN THE PAST MONTH?: NO
6. HAVE YOU EVER DONE ANYTHING, STARTED TO DO ANYTHING, OR PREPARED TO DO ANYTHING TO END YOUR LIFE?: NO
1. IN THE PAST MONTH, HAVE YOU WISHED YOU WERE DEAD OR WISHED YOU COULD GO TO SLEEP AND NOT WAKE UP?: NO

## 2025-02-25 ASSESSMENT — ACTIVITIES OF DAILY LIVING (ADL)
ADLS_ACUITY_SCORE: 47
ADLS_ACUITY_SCORE: 47
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
## 2025-04-08 ENCOUNTER — DOCUMENTATION ONLY (OUTPATIENT)
Dept: OTHER | Facility: CLINIC | Age: 55
End: 2025-04-08
Payer: COMMERCIAL

## 2025-04-08 DIAGNOSIS — M25.562 ACUTE PAIN OF LEFT KNEE: Primary | ICD-10-CM
